# Patient Record
Sex: FEMALE | Race: WHITE | NOT HISPANIC OR LATINO | URBAN - METROPOLITAN AREA
[De-identification: names, ages, dates, MRNs, and addresses within clinical notes are randomized per-mention and may not be internally consistent; named-entity substitution may affect disease eponyms.]

---

## 2018-09-04 ENCOUNTER — OFFICE VISIT (OUTPATIENT)
Dept: OPHTHALMOLOGY | Facility: CLINIC | Age: 43
End: 2018-09-04
Attending: OPHTHALMOLOGY

## 2018-09-04 DIAGNOSIS — H40.003 GLAUCOMA SUSPECT OF BOTH EYES: Primary | ICD-10-CM

## 2018-09-04 PROCEDURE — G0463 HOSPITAL OUTPT CLINIC VISIT: HCPCS | Mod: ZF

## 2018-09-04 PROCEDURE — 92015 DETERMINE REFRACTIVE STATE: CPT | Mod: ZF

## 2018-09-04 PROCEDURE — 92133 CPTRZD OPH DX IMG PST SGM ON: CPT | Mod: ZF | Performed by: OPHTHALMOLOGY

## 2018-09-04 ASSESSMENT — TONOMETRY
OD_IOP_MMHG: 14
IOP_METHOD: TONOPEN
OS_IOP_MMHG: 14

## 2018-09-04 ASSESSMENT — CUP TO DISC RATIO
OS_RATIO: 0.8
OD_RATIO: 0.8

## 2018-09-04 ASSESSMENT — REFRACTION_MANIFEST
OS_SPHERE: -1.50
OD_AXIS: 134
OD_ADD: +2.00
OS_ADD: +2.00
OD_CYLINDER: +1.00
OD_SPHERE: -1.50
OS_CYLINDER: SPHERE

## 2018-09-04 ASSESSMENT — REFRACTION_WEARINGRX
OD_SPHERE: -1.50
OS_SPHERE: -1.50
OD_AXIS: 048
OD_CYLINDER: +0.25
SPECS_TYPE: SVL
OS_CYLINDER: +0.25
OS_AXIS: 048

## 2018-09-04 ASSESSMENT — PACHYMETRY
OD_CT(UM): 598
OS_CT(UM): 590

## 2018-09-04 ASSESSMENT — VISUAL ACUITY
OS_CC+: +2
OD_CC: 20/25
OD_CC+: -3
METHOD: SNELLEN - LINEAR
OS_PH_CC: 20/25+1
OS_CC: 20/30

## 2018-09-04 ASSESSMENT — SLIT LAMP EXAM - LIDS
COMMENTS: NORMAL
COMMENTS: NORMAL

## 2018-09-04 ASSESSMENT — CONF VISUAL FIELD
METHOD: COUNTING FINGERS
OS_NORMAL: 1
OD_NORMAL: 1

## 2018-09-04 ASSESSMENT — EXTERNAL EXAM - RIGHT EYE: OD_EXAM: NORMAL

## 2018-09-04 ASSESSMENT — EXTERNAL EXAM - LEFT EYE: OS_EXAM: NORMAL

## 2018-09-04 NOTE — MR AVS SNAPSHOT
"              After Visit Summary   9/4/2018    Mary Brady    MRN: [id number]           Patient Information     Date Of Birth          1975        Visit Information        Provider Department      9/4/2018 2:00 PM Ulises [facility]itia [facility]        Today's Diagnoses     Glaucoma suspect of both eyes    -  1       Follow-ups after your visit        Your next 10 appointments already scheduled     Sep 05, 2018 12:00 PM CDT   MA SCREEN IMPLANTS BILATERAL W/ SPENCER with SHBCMA2   [facility] [place] ([facility])    [address] [place] [address], [address]  [facility] [address] [phone]           Do not use any powder, lotion or deodorant under your arms or on your breast. If you do, we will ask you to remove it before your exam.  Wear comfortable, two-piece clothing.  If you have any allergies, tell your care team.  Bring any previous mammograms from other facilities or have them mailed to the breast center.  Three-dimensional (3D) mammograms are available at [place] locations in [facility], [facility], [facility], and [place]. [facility] locations include [place] and [facility] in [place]. Benefits of 3D mammograms include: - Improved rate of cancer detection - Decreases your chance of having to go back for more tests, which means fewer: - \"False-positive\" results (This means that there is an abnormal area but it isn't cancer.) - Invasive testing procedures, such as a biopsy or surgery - Can provide clearer images of the breast if you have dense breast tissue. 3D mammography is an optional exam that anyone can have with a 2D mammogram. It doesn't replace or take the place of a 2D mammogram. 2D mammograms remain an effective screening test for all women.  Not all insurance companies cover the cost of a 3D mammogram. Check with your insurance.              Who to contact     Please call your clinic at [phone] " to:    Ask questions about your health    Make or cancel appointments    Discuss your medicines    Learn about your test results    Speak to your doctor            Additional Information About Your Visit        Care EveryWhere ID     This is your Care EveryWhere ID. This could be used by other organizations to access your [facility] medical records  [id number]         Blood Pressure from Last 3 Encounters:   No data found for BP    Weight from Last 3 Encounters:   No data found for Wt              We Performed the Following     OCT Optic Nerve RNFL Spectralis OU (both eyes)     Pachymetry OU (both eyes)        Primary Care Provider    None Specified       No primary provider on file.        Equal Access to Services     [facility]: Hadii aad ku hadasho Soomaali, waaxda luqadaha, qaybta kaalmada adeelenayatommy, jermaine munguia . So [facility] [phone].    ATENCIÓN: Si habla español, tiene a jiang disposición servicios gratuitos de asistencia lingüística. Llame al [phone].    We comply with applicable federal civil rights laws and [place] laws. We do not discriminate on the basis of race, color, national origin, age, disability, sex, sexual orientation, or gender identity.            Thank you!     Thank you for choosing [facility]  for your care. Our goal is always to provide you with excellent care. Hearing back from our patients is one way we can continue to improve our services. Please take a few minutes to complete the written survey that you may receive in the mail after your visit with us. Thank you!             Your Updated Medication List - Protect others around you: Learn how to safely use, store and throw away your medicines at www.disposemymeds.org.          This list is accurate as of 9/4/18  4:55 PM.  Always use your most recent med list.                   Brand Name Dispense Instructions for use Diagnosis    LEVOTHYROXINE SODIUM PO      Take 50 mg by mouth daily         SIMVASTATIN PO      Take 20 mg by mouth daily

## 2018-09-04 NOTE — NURSING NOTE
Chief Complaints and History of Present Illnesses   Patient presents with     Consult For     possible glaucoma     HPI    Additional Referring Providers:  Self referred   Affected eye(s):  Both   Symptoms:        Unknown duration    Frequency:  Constant       Do you have eye pain now?:  No      Comments:  Mary is here for an eye exam. She has been told she might have the start of glaucoma. She was in [facility] when she was told this, and is not certain, so she wants a second opinion. She feels she sees well and has no discomfort or flashes . She has had floaters in the past, but not for some time.     Kj Raymundo COT 3:19 PM September 4, 2018

## 2018-09-04 NOTE — PROGRESS NOTES
Assessment & Plan      Mary Brady is a 43 year old female with the following diagnoses:   1. Glaucoma suspect of both eyes         Here from [facility] for second opinion on possible glaucoma  Noted to have cupping by local ophthalmology and recommended to start drops.  Second doctor told her things were ok and to monitor.  Outside visual field test within normal limits (reviewed)    Very large disc right eye > left eye with associated cupping  Maximum intraocular pressure unknown (14 both eyes today)  Family history: positive - mother at late age  Trauma history: negative  Gonioscopy: open both eyes     Today's testing:  Pachmetry: 598/590  OCT nerve fiber layer September 4, 2018: Normal both eyes - large disc by BMO right eye > left eye     Low suspicion.  Likely physiologic cupping   Recommend monitoring with repeat intraocular pressure, dilated fundus exam and OCT/VF in 6-12 mo          Attending Physician Attestation:  Complete documentation of historical and exam elements from today's encounter can be found in the full encounter summary report (not reduplicated in this progress note).  I personally obtained the chief complaint(s) and history of present illness.  I confirmed and edited as necessary the review of systems, past medical/surgical history, family history, social history, and examination findings as documented by others; and I examined the patient myself.  I personally reviewed the relevant tests, images, and reports as documented above.  I formulated and edited as necessary the assessment and plan and discussed the findings and management plan with the patient and family. . - Ulises Avitia MD

## 2018-09-05 ENCOUNTER — HOSPITAL ENCOUNTER (OUTPATIENT)
Dept: MAMMOGRAPHY | Facility: CLINIC | Age: 43
Discharge: HOME | End: 2018-09-05
Attending: OBSTETRICS & GYNECOLOGY | Admitting: OBSTETRICS & GYNECOLOGY

## 2018-09-05 DIAGNOSIS — Z12.31 VISIT FOR SCREENING MAMMOGRAM: ICD-10-CM

## 2018-09-05 PROCEDURE — 77063 BREAST TOMOSYNTHESIS BI: CPT

## 2018-09-06 ENCOUNTER — TELEPHONE (OUTPATIENT)
Dept: OPHTHALMOLOGY | Facility: CLINIC | Age: 43
End: 2018-09-06

## 2018-09-06 NOTE — TELEPHONE ENCOUNTER
[facility] Call Center    Phone Message    May a detailed message be left on voicemail: yes    Reason for Call: Other: Pt reported seeing Dr. Avitia on Tues. 9/4/18, she indicated she had an exam, BUT pt did not receive her new Rx for glasses. Pt would like the new Rx emailed to her, please. See email in her chart. If need be, call the pt on her cellph#. Thank you.     Action Taken: Message routed to:  [facility] ([facility]): [facility]

## 2018-09-07 NOTE — TELEPHONE ENCOUNTER
Called pt back to let her know we can mail, fax, or she can check myChart.      She wanted to have it mailed to her

## 2021-10-20 ENCOUNTER — HOSPITAL ENCOUNTER (OUTPATIENT)
Dept: MAMMOGRAPHY | Facility: CLINIC | Age: 46
End: 2021-10-20
Attending: NURSE PRACTITIONER

## 2021-10-20 ENCOUNTER — OFFICE VISIT (OUTPATIENT)
Dept: OBGYN | Facility: CLINIC | Age: 46
End: 2021-10-20

## 2021-10-20 VITALS
HEART RATE: 80 BPM | DIASTOLIC BLOOD PRESSURE: 74 MMHG | HEIGHT: 66 IN | BODY MASS INDEX: 34.39 KG/M2 | SYSTOLIC BLOOD PRESSURE: 116 MMHG | WEIGHT: 214 LBS

## 2021-10-20 DIAGNOSIS — N92.4 EXCESSIVE BLEEDING IN PREMENOPAUSAL PERIOD: ICD-10-CM

## 2021-10-20 DIAGNOSIS — Z01.419 ENCOUNTER FOR GYNECOLOGICAL EXAMINATION WITHOUT ABNORMAL FINDING: Primary | ICD-10-CM

## 2021-10-20 DIAGNOSIS — N63.20 LEFT BREAST LUMP: ICD-10-CM

## 2021-10-20 PROCEDURE — G0145 SCR C/V CYTO,THINLAYER,RESCR: HCPCS | Performed by: NURSE PRACTITIONER

## 2021-10-20 PROCEDURE — 99386 PREV VISIT NEW AGE 40-64: CPT | Performed by: NURSE PRACTITIONER

## 2021-10-20 PROCEDURE — G0279 TOMOSYNTHESIS, MAMMO: HCPCS

## 2021-10-20 PROCEDURE — 87624 HPV HI-RISK TYP POOLED RSLT: CPT | Performed by: NURSE PRACTITIONER

## 2021-10-20 RX ORDER — NORETHINDRONE ACETATE AND ETHINYL ESTRADIOL 1.5-30(21)
1 KIT ORAL DAILY
Qty: 84 TABLET | Refills: 3 | Status: SHIPPED | OUTPATIENT
Start: 2021-10-20

## 2021-10-20 RX ORDER — NORETHINDRONE ACETATE AND ETHINYL ESTRADIOL 1.5-30(21)
1 KIT ORAL DAILY
Qty: 84 TABLET | Refills: 3 | Status: SHIPPED | OUTPATIENT
Start: 2021-10-20 | End: 2021-10-20

## 2021-10-20 ASSESSMENT — PATIENT HEALTH QUESTIONNAIRE - PHQ9
5. POOR APPETITE OR OVEREATING: NOT AT ALL
SUM OF ALL RESPONSES TO PHQ QUESTIONS 1-9: 6

## 2021-10-20 ASSESSMENT — ANXIETY QUESTIONNAIRES
5. BEING SO RESTLESS THAT IT IS HARD TO SIT STILL: NOT AT ALL
GAD7 TOTAL SCORE: 0
2. NOT BEING ABLE TO STOP OR CONTROL WORRYING: NOT AT ALL
1. FEELING NERVOUS, ANXIOUS, OR ON EDGE: NOT AT ALL
6. BECOMING EASILY ANNOYED OR IRRITABLE: NOT AT ALL
7. FEELING AFRAID AS IF SOMETHING AWFUL MIGHT HAPPEN: NOT AT ALL
3. WORRYING TOO MUCH ABOUT DIFFERENT THINGS: NOT AT ALL

## 2021-10-20 ASSESSMENT — MIFFLIN-ST. JEOR: SCORE: 1617.45

## 2021-10-20 NOTE — PROGRESS NOTES
Mary is a 46 year old No obstetric history on file. female who presents for annual exam.     Besides routine health maintenance,  she would like to discuss her periods.    HPI:  [place]  Patient is here with her sister today.      New patient to me here today for her annual GYN exam, Pap smear and mammogram.  She is perimenopausal and having very regular menstrual cycles.  They are heavy to the point of missing work for 3 days when she is changing her pad every 30 minutes.  She denies excessive pain with her menstrual cycle.  She had been on oral contraceptive tablets and is unsure of the dosing.  She stopped these 9 months ago and her cycle resumed heaviness.  She had no side effects with the birth control but was concerned with malignancy as her sister carries a diagnosis of breast cancer.  She has a mammogram today at the breast center.  She does have a history of HPV and is requesting a repeat Pap smear today.    She is here in the [facility] for the summer and plans on returning to [place] in 2 weeks but will return next year.      GYNECOLOGIC HISTORY:    Patient's last menstrual period was 09/25/2021.    Regular menses? yes  Menses every 28 days.  Length of menses: 7 days, 3 of those days is very heavy changing out every 30 minutes.    Her current contraception method is: not sexually active.  She  reports that she has never smoked. She has never used smokeless tobacco.    Patient is not sexually active.  STD testing offered?  Declined  Last PHQ-9 score on record =   PHQ-9 SCORE 10/20/2021   PHQ-9 Total Score 6     Last GAD7 score on record =   BRUCE-7 SCORE 10/20/2021   Total Score 0     Alcohol Score = 0    HEALTH MAINTENANCE:  Cholesterol: does [place] with PCP  Last Mammo: 2018, Result: Normal, Next Mammo: Today   [place]ap: [place]  Colonoscopy: long ago, Result: Not applicable, Next Colonoscopy: age 48.  Dexa:  A few years ago    Health maintenance updated:  yes    HISTORY:  OB History  "   Para Term  AB Living   8 6 6 0 2 4   SAB TAB Ectopic Multiple Live Births   2 0 0 0 6      # Outcome Date GA Lbr [place]/2nd Weight Sex Delivery Anes PTL Lv   8 Term            7 Term            6 SAB            5 SAB            4 Term            3 Term            2 Term            1 Term                There is no problem list on file for this patient.    Past Surgical History:   Procedure Laterality Date      SECTION      x2      Social History     Tobacco Use     Smoking status: Never Smoker     Smokeless tobacco: Never Used   Substance Use Topics     Alcohol use: No      Problem (# of Occurrences) Relation (Name,Age of Onset)    Breast Cancer (1) Sister    Diabetes (2) Mother, Father    Glaucoma (1) Other (uncle)       Negative family history of: Cancer            Current Outpatient Medications   Medication Sig     Dupilumab (DUPIXENT SC)      LEVOTHYROXINE SODIUM PO Take 50 mg by mouth daily     norethindrone-ethinyl estradiol-iron (MICROGESTIN FE1.5/30) 1.5-30 MG-MCG tablet Take 1 tablet by mouth daily     SIMVASTATIN PO Take 20 mg by mouth daily     No current facility-administered medications for this visit.     No Known Allergies    Past medical, surgical, social and family histories were reviewed and updated in EPIC.    ROS:   12 point review of systems negative other than symptoms noted below or in the HPI.  Genitourinary: Heavy Bleeding with Period and Vaginal Dryness  Normal menstrual cycles, POSITIVE for:, urinary frequency, heavy periods    EXAM:  /74   Pulse 80   Ht 1.676 m (5' 6\")   Wt 97.1 kg (214 lb)   LMP 2021   BMI 34.54 kg/m     BMI: Body mass index is 34.54 kg/m .    PHYSICAL EXAM:  Constitutional:   Appearance: Well nourished, well developed, alert, in no acute distress  Neck:  Lymph Nodes:  No lymphadenopathy present    Thyroid:  Gland size normal, nontender, no nodules or masses present  on palpation  Chest:  Respiratory Effort:  Breathing " unlabored  Cardiovascular:    Heart: Auscultation:  Regular rate, normal rhythm, no murmurs present  Breasts: Inspection of Breasts:  No lymphadenopathy present., Axillary Lymph Nodes:  No lymphadenopathy present. and No nodularity, asymmetry or nipple discharge bilaterally.  Bilateral breast reduction scars.  Bilateral small implants in place.  Left breast has a very small 5 mm round semimobile mass at the 8 o'clock position 4 cm from the nipple.  Gastrointestinal:   Abdominal Examination:  Abdomen nontender to palpation, tone normal without rigidity or guarding, no masses present, umbilicus without lesions   Liver and Spleen:  No hepatomegaly present, liver nontender to palpation    Hernias:  No hernias present  Lymphatic: Lymph Nodes:  No other lymphadenopathy present  Skin:  General Inspection:  No rashes present, no lesions present, no areas of  discoloration  Neurologic:    Mental Status:  Oriented X3.  Normal strength and tone, sensory exam                grossly normal, mentation intact and speech normal.    Psychiatric:   Mentation appears normal and affect normal/bright.         Pelvic Exam:  External Genitalia:     Normal appearance for age, no discharge present, no tenderness present, no inflammatory lesions present, color normal  Vagina:     Normal vaginal vault without central or paravaginal defects, no discharge present, no inflammatory lesions present, no masses present  Bladder:     Nontender to palpation  Urethra:   Urethral Body:  Urethra palpation normal, urethra structural support normal   Urethral Meatus:  No erythema or lesions present  Cervix:     Appearance healthy, no lesions present, nontender to palpation, no bleeding present  Uterus:     Uterus: firm, normal sized and nontender, anteverted in position.   Adnexa:     No adnexal tenderness present, no adnexal masses present  Perineum:     Perineum within normal limits, no evidence of trauma, no rashes or skin lesions present  Anus:      Anus within normal limits, no hemorrhoids present  Inguinal Lymph Nodes:     No lymphadenopathy present  Pubic Hair:     Normal pubic hair distribution for age  Genitalia and Groin:     No rashes present, no lesions present, no areas of discoloration, no masses present      COUNSELING:   Special attention given to:        Regular exercise       Healthy diet/nutrition       (Stephanie)menopause management    BMI: Body mass index is 34.54 kg/m .  Weight management plan: Discussed healthy diet and exercise guidelines    ASSESSMENT:  46 year old female with satisfactory annual exam.    ICD-10-CM    1. Encounter for gynecological examination without abnormal finding  Z01.419 Pap imaged thin layer screen with HPV - recommended age 30 - 65 years (select HPV order below)   2. Excessive bleeding in premenopausal period  N92.4 US Transvaginal Non OB     norethindrone-ethinyl estradiol-iron (MICROGESTIN FE1.5/30) 1.5-30 MG-MCG tablet     DISCONTINUED: norethindrone-ethinyl estradiol-iron (MICROGESTIN FE1.5/30) 1.5-30 MG-MCG tablet     DISCONTINUED: norethindrone-ethinyl estradiol-iron (MICROGESTIN FE1.5/30) 1.5-30 MG-MCG tablet     DISCONTINUED: norethindrone-ethinyl estradiol-iron (MICROGESTIN FE1.5/30) 1.5-30 MG-MCG tablet     DISCONTINUED: norethindrone-ethinyl estradiol-iron (MICROGESTIN FE1.5/30) 1.5-30 MG-MCG tablet   3. Left breast lump  N63.20 US Breast Left Limited 1-3 Quadrants     MA Diagnostic Digital Bilateral       PLAN:  46-year-old perimenopausal female with heavy menstrual bleeding.  We discussed endometrial ablation as a possibility but will cover her with oral contraceptives until next summer when she returns to the [facility] in we will likely move forward with a pelvic ultrasound and work-up for this.  Pap smear was taken and if it is normal she can repeat in 1 year.  In light of her new left breast lump we did order diagnostic work-up and she will have this done today.    ELIZA Salgado CNP

## 2021-10-21 ASSESSMENT — ANXIETY QUESTIONNAIRES: GAD7 TOTAL SCORE: 0

## 2021-10-25 LAB
BKR LAB AP GYN ADEQUACY: NORMAL
BKR LAB AP GYN INTERPRETATION: NORMAL
BKR LAB AP HPV REFLEX: NORMAL
BKR LAB AP PREVIOUS ABNORMAL: NORMAL
PATH REPORT.COMMENTS IMP SPEC: NORMAL
PATH REPORT.RELEVANT HX SPEC: NORMAL

## 2021-10-26 ENCOUNTER — PATIENT OUTREACH (OUTPATIENT)
Dept: OBGYN | Facility: CLINIC | Age: 46
End: 2021-10-26

## 2021-10-26 LAB
HUMAN PAPILLOMA VIRUS 16 DNA: NEGATIVE
HUMAN PAPILLOMA VIRUS 18 DNA: NEGATIVE
HUMAN PAPILLOMA VIRUS FINAL DIAGNOSIS: NORMAL
HUMAN PAPILLOMA VIRUS OTHER HR: NEGATIVE

## 2021-10-26 NOTE — LETTER
October 26, 2021      Mary Brady  [address] [place] [address]        Dear ,    We are happy to inform you that your recent Pap smear and Human Papillomavirus (HPV) test results are normal and negative.    It is recommended that you have your next Pap smear and Human Papillomavirus (HPV) test in 1 year. You will also need to return to the clinic every year for an annual wellness visit.    If you have additional questions regarding this result, please contact our office and we will be happy to assist you.      Sincerely,    Your [facility] Care Team

## 2021-10-26 NOTE — PROGRESS NOTES
SUBJECTIVE:                                                   Mary Brady is a 46 year old female who presents to clinic today for the following health issue(s):  Patient presents with:  Ultrasound: for heavy menses      Additional information: for heavy menstrual bleeding- OCP's started    HPI:  Patient here today with her sister for ultrasound evaluation of heavy menstrual cycles.  I saw her last 1 week ago for her annual exam.  We started her on a low-dose oral contraceptive tablets for cycle control and her bleeding has stopped to a very light brown spotting.    She is going back home to [place] on .    Patient's last menstrual period was 2021..     Patient is not sexually active, .  Using not sexually active for contraception.    reports that she has never smoked. She has never used smokeless tobacco.    STD testing offered?  Declined - not sexually active    Health maintenance updated:  yes    Today's PHQ-2 Score: No flowsheet data found.  Today's PHQ-9 Score:   PHQ-9 SCORE 10/20/2021   PHQ-9 Total Score 6     Today's BRUCE-7 Score:   BRUCE-7 SCORE 10/20/2021   Total Score 0       Problem list and histories reviewed & adjusted, as indicated.  Additional history: as documented.    Patient Active Problem List   Diagnosis     High risk human papilloma virus (HPV) infection of cervix     Past Surgical History:   Procedure Laterality Date      SECTION      x2      Social History     Tobacco Use     Smoking status: Never Smoker     Smokeless tobacco: Never Used   Substance Use Topics     Alcohol use: No      Problem (# of Occurrences) Relation (Name,Age of Onset)    Breast Cancer (1) Sister    Diabetes (2) Mother, Father    Glaucoma (1) Other (uncle)       Negative family history of: Cancer            Current Outpatient Medications   Medication Sig     Dupilumab (DUPIXENT SC)      LEVOTHYROXINE SODIUM PO Take 50 mg by mouth daily     norethindrone-ethinyl estradiol-iron (MICROGESTIN  "FE1.5/30) 1.5-30 MG-MCG tablet Take 1 tablet by mouth daily     SIMVASTATIN PO Take 20 mg by mouth daily     No current facility-administered medications for this visit.     No Known Allergies    ROS:  12 point review of systems negative other than symptoms noted below or in the HPI.  No urinary frequency or dysuria, bladder or kidney problems, Normal menstrual cycles, POSITIVE for:, painful menses, heavy periods      OBJECTIVE:     /62   Ht 1.676 m (5' 6\")   Wt 96.6 kg (213 lb)   LMP 09/23/2021   BMI 34.38 kg/m    Body mass index is 34.38 kg/m .    Exam:  Constitutional:  Appearance: Well nourished, well developed alert, in no acute distress  Psychiatric:  Mentation appears normal and affect normal/bright.     In-Clinic Test Results:  Results for orders placed or performed in visit on 10/27/21 (from the past 24 hour(s))   US Transvaginal Non OB    Narrative    [facility]  ULTRASOUND - PELVIC GYN- Transvaginal     Referring MD: Miley Betts APRN CNP     ===================================     CLINICAL INFORMATION     Indications for ultrasound:   Bleeding/Menses - Menorrhagia (heavy menses)     LMP: 09/23/2021    Hormones: OCP      Measurements:  Uterus:  10.86 x 7.76 x 6.14 cm     Position is anteverted.  Contour is irregular w/ myomata: 1 Right 5.41 x   4.99 x 5.20 cm.     Endo cav: 16.03 mm       Prominent     Right ovary: 2.15 x 1.98 x 1.86 cm  Wnl  Left ovary:   2.46 x 2.00 x 1.53 cm Wnl     Cul de sac: no free fluid     ===================================  Impression:   Fibroid abutting the endometrial cavity.  Potentially slight impingement   on the endometrium.     Lora Rodriguez MD       ASSESSMENT/PLAN:                                                        ICD-10-CM    1. Excessive bleeding in premenopausal period  N92.4    2. Submucous leiomyoma of uterus  D25.0        There are no Patient Instructions on file for this visit.    Ultrasound from today shows a " thickened uterine lining at 16.03 mm but she would be due for her normal menstrual cycle if she did not start her oral contraceptive tablets earlier than suggested.  Her ovaries are within normal limits.  There is a singular fibroid measuring 5.41 cm that is possibly impeding on the endometrial lining.  We had previously discussed endometrial ablation but with the known fibroid we will consult with the surgeon and see if this is an option for an office procedure.    ELIZA Salgado CNP  [facility] [place]

## 2021-10-27 ENCOUNTER — ANCILLARY PROCEDURE (OUTPATIENT)
Dept: ULTRASOUND IMAGING | Facility: CLINIC | Age: 46
End: 2021-10-27
Attending: NURSE PRACTITIONER

## 2021-10-27 ENCOUNTER — OFFICE VISIT (OUTPATIENT)
Dept: OBGYN | Facility: CLINIC | Age: 46
End: 2021-10-27

## 2021-10-27 VITALS
HEIGHT: 66 IN | WEIGHT: 213 LBS | DIASTOLIC BLOOD PRESSURE: 62 MMHG | BODY MASS INDEX: 34.23 KG/M2 | SYSTOLIC BLOOD PRESSURE: 106 MMHG

## 2021-10-27 DIAGNOSIS — D25.0 SUBMUCOUS LEIOMYOMA OF UTERUS: ICD-10-CM

## 2021-10-27 DIAGNOSIS — N92.4 EXCESSIVE BLEEDING IN PREMENOPAUSAL PERIOD: ICD-10-CM

## 2021-10-27 DIAGNOSIS — N92.4 EXCESSIVE BLEEDING IN PREMENOPAUSAL PERIOD: Primary | ICD-10-CM

## 2021-10-27 PROCEDURE — 76830 TRANSVAGINAL US NON-OB: CPT | Performed by: OBSTETRICS & GYNECOLOGY

## 2021-10-27 PROCEDURE — 99213 OFFICE O/P EST LOW 20 MIN: CPT | Performed by: NURSE PRACTITIONER

## 2021-10-27 ASSESSMENT — MIFFLIN-ST. JEOR: SCORE: 1612.91

## 2021-11-01 ENCOUNTER — TELEPHONE (OUTPATIENT)
Dept: OBGYN | Facility: CLINIC | Age: 46
End: 2021-11-01

## 2021-11-01 NOTE — TELEPHONE ENCOUNTER
Called patient and spoke to her sister. Discussed scheduling phone visit and sister clarified they just want copy of report. Will  this week.

## 2021-11-01 NOTE — TELEPHONE ENCOUNTER
Informed of normal pap/hpv-letter has been sent.  Mary and her sister have multiple questions regarding last OV, next steps, procedure needed.   Advised phone visit.  Hung up before she could be transferred to [facility]. Will try and make a phone visit before going to [facility]. Routing to both Miley Betts NP and scheduling. Leaving 11/4  Lillian Bar RN on 11/1/2021 at 10:01 AM

## 2021-11-01 NOTE — TELEPHONE ENCOUNTER
Pt would like to talk about her results from last week after seeing Miley    Please callback: [phone]

## 2022-10-04 ENCOUNTER — PATIENT OUTREACH (OUTPATIENT)
Dept: OBGYN | Facility: CLINIC | Age: 47
End: 2022-10-04

## 2022-10-04 DIAGNOSIS — B97.7 HIGH RISK HUMAN PAPILLOMA VIRUS (HPV) INFECTION OF CERVIX: ICD-10-CM

## 2022-10-04 DIAGNOSIS — N72 HIGH RISK HUMAN PAPILLOMA VIRUS (HPV) INFECTION OF CERVIX: ICD-10-CM

## 2022-10-04 NOTE — LETTER
October 4, 2022      Mary Brady  [address] [place] [address]        Dear ,    This letter is to remind you that you are due for your follow-up Pap smear and Human Papillomavirus (HPV) test.    Please call [phone] to schedule your appointment at your earliest convenience.    If you have completed the appointment outside of the [facility] system, please have the records forwarded to our office. We will update your chart for your provider to review before your next annual wellness visit.     Thank you for choosing [facility]!      Sincerely,    Your [facility] Care Team

## 2022-11-08 NOTE — TELEPHONE ENCOUNTER
Patient due for Pap and HPV.    Reminder done today via telephone call - pt's sister notified. Consent to communicate on file

## 2022-12-06 NOTE — TELEPHONE ENCOUNTER
FYI to provider - Patient is lost to pap tracking follow-up. Attempts to contact pt have been made per reminder process and there has been no reply and/or no appt scheduled. Contact hx listed below.     2012 HPV infection seen in medical history  10/20/21 NIL pap, neg HPV. Plan: cotest in 1 year per provider  10/04/22 Reminder letter  11/08/22 Reminder call - pt's sister notified. Consent to communicate on file  12/06/22 Lost to follow-up for pap tracking